# Patient Record
Sex: MALE | URBAN - METROPOLITAN AREA
[De-identification: names, ages, dates, MRNs, and addresses within clinical notes are randomized per-mention and may not be internally consistent; named-entity substitution may affect disease eponyms.]

---

## 2020-02-04 ENCOUNTER — APPOINTMENT (RX ONLY)
Dept: URBAN - METROPOLITAN AREA CLINIC 45 | Facility: CLINIC | Age: 50
Setting detail: DERMATOLOGY
End: 2020-02-04

## 2020-02-04 DIAGNOSIS — L57.8 OTHER SKIN CHANGES DUE TO CHRONIC EXPOSURE TO NONIONIZING RADIATION: ICD-10-CM

## 2020-02-04 DIAGNOSIS — D485 NEOPLASM OF UNCERTAIN BEHAVIOR OF SKIN: ICD-10-CM

## 2020-02-04 DIAGNOSIS — L82.1 OTHER SEBORRHEIC KERATOSIS: ICD-10-CM

## 2020-02-04 DIAGNOSIS — L21.8 OTHER SEBORRHEIC DERMATITIS: ICD-10-CM

## 2020-02-04 DIAGNOSIS — L57.0 ACTINIC KERATOSIS: ICD-10-CM

## 2020-02-04 PROBLEM — D48.5 NEOPLASM OF UNCERTAIN BEHAVIOR OF SKIN: Status: ACTIVE | Noted: 2020-02-04

## 2020-02-04 PROBLEM — I48.91 UNSPECIFIED ATRIAL FIBRILLATION: Status: ACTIVE | Noted: 2020-02-04

## 2020-02-04 PROCEDURE — ? COUNSELING

## 2020-02-04 PROCEDURE — ? BIOPSY BY SHAVE METHOD

## 2020-02-04 PROCEDURE — ? PRESCRIPTION

## 2020-02-04 PROCEDURE — 11102 TANGNTL BX SKIN SINGLE LES: CPT

## 2020-02-04 PROCEDURE — 99203 OFFICE O/P NEW LOW 30 MIN: CPT | Mod: 25

## 2020-02-04 PROCEDURE — ? TREATMENT REGIMEN

## 2020-02-04 RX ORDER — KETOCONAZOLE 20 MG/ML
SHAMPOO TOPICAL TIW
Qty: 1 | Refills: 1 | Status: ERX | COMMUNITY
Start: 2020-02-04

## 2020-02-04 RX ADMIN — KETOCONAZOLE: 20 SHAMPOO TOPICAL at 00:00

## 2020-02-04 ASSESSMENT — LOCATION DETAILED DESCRIPTION DERM
LOCATION DETAILED: LEFT SUPERIOR LATERAL BUCCAL CHEEK
LOCATION DETAILED: LEFT CENTRAL MALAR CHEEK
LOCATION DETAILED: RIGHT SUPERIOR MEDIAL UPPER BACK
LOCATION DETAILED: RIGHT SUPERIOR LATERAL BUCCAL CHEEK
LOCATION DETAILED: LEFT SUPERIOR MUCOSAL LIP
LOCATION DETAILED: RIGHT ANTERIOR SHOULDER
LOCATION DETAILED: LEFT SUPERIOR LATERAL MALAR CHEEK
LOCATION DETAILED: RIGHT SUPERIOR MUCOSAL LIP
LOCATION DETAILED: LEFT DISTAL DORSAL FOREARM
LOCATION DETAILED: LEFT LATERAL KNEE
LOCATION DETAILED: LEFT DISTAL PRETIBIAL REGION
LOCATION DETAILED: RIGHT SUPERIOR LATERAL MALAR CHEEK
LOCATION DETAILED: RIGHT CENTRAL MALAR CHEEK
LOCATION DETAILED: RIGHT CENTRAL EYEBROW
LOCATION DETAILED: LEFT CENTRAL EYEBROW

## 2020-02-04 ASSESSMENT — LOCATION SIMPLE DESCRIPTION DERM
LOCATION SIMPLE: RIGHT CHEEK
LOCATION SIMPLE: LEFT CHEEK
LOCATION SIMPLE: LEFT FOREARM
LOCATION SIMPLE: RIGHT SHOULDER
LOCATION SIMPLE: RIGHT UPPER BACK
LOCATION SIMPLE: LEFT PRETIBIAL REGION
LOCATION SIMPLE: RIGHT EYEBROW
LOCATION SIMPLE: LEFT KNEE
LOCATION SIMPLE: LEFT EYEBROW
LOCATION SIMPLE: LEFT SUPERIOR MUCOSAL LIP
LOCATION SIMPLE: RIGHT SUPERIOR MUCOSAL LIP

## 2020-02-04 ASSESSMENT — LOCATION ZONE DERM
LOCATION ZONE: TRUNK
LOCATION ZONE: LIP
LOCATION ZONE: FACE
LOCATION ZONE: ARM
LOCATION ZONE: LEG

## 2020-02-04 NOTE — PROCEDURE: BIOPSY BY SHAVE METHOD
Detail Level: Detailed
Depth Of Biopsy: dermis
Was A Bandage Applied: Yes
Size Of Lesion In Cm: 3.5
Biopsy Type: H and E
Biopsy Method: Personna blade
Anesthesia Type: 1% lidocaine with epinephrine
Anesthesia Volume In Cc (Will Not Render If 0): 0.5
Additional Anesthesia Volume In Cc (Will Not Render If 0): 0
Hemostasis: Electrocautery
Wound Care: Aquaphor
Dressing: bandage
Destruction After The Procedure: No
Type Of Destruction Used: Curettage
Curettage Text: The wound bed was treated with curettage after the biopsy was performed.
Cryotherapy Text: The wound bed was treated with cryotherapy after the biopsy was performed.
Electrodesiccation Text: The wound bed was treated with electrodesiccation after the biopsy was performed.
Electrodesiccation And Curettage Text: The wound bed was treated with electrodesiccation and curettage after the biopsy was performed.
Silver Nitrate Text: The wound bed was treated with silver nitrate after the biopsy was performed.
Lab: [id number]
Lab Facility: [id number]
Consent: Written consent was obtained and risks were reviewed including but not limited to scarring, infection, bleeding, scabbing, incomplete removal, nerve damage and allergy to anesthesia.
Post-Care Instructions: I reviewed with the patient in detail post-care instructions. Patient is to keep the biopsy site dry overnight, and then apply bacitracin twice daily until healed. Patient may apply hydrogen peroxide soaks to remove any crusting.
Notification Instructions: Patient will be notified of biopsy results. However, patient instructed to call the office if not contacted within 2 weeks.
Billing Type: Third-Party Bill

## 2020-02-04 NOTE — PROCEDURE: TREATMENT REGIMEN
Plan: Will recommend treatment based on biopsy results of lesion on R shoulder.
Detail Level: Detailed
Plan: Consult dentist for recommendation of oral surgeon to perform biopsy.
Initiate Treatment: ketoconazole 2 % shampoo TIW\\nDays Supply: 30\\nSig: Wash face daily then rinse

## 2020-02-25 ENCOUNTER — APPOINTMENT (RX ONLY)
Dept: URBAN - METROPOLITAN AREA CLINIC 45 | Facility: CLINIC | Age: 50
Setting detail: DERMATOLOGY
End: 2020-02-25

## 2020-02-25 DIAGNOSIS — L57.0 ACTINIC KERATOSIS: ICD-10-CM | Status: INADEQUATELY CONTROLLED

## 2020-02-25 PROCEDURE — 17000 DESTRUCT PREMALG LESION: CPT

## 2020-02-25 PROCEDURE — 17003 DESTRUCT PREMALG LES 2-14: CPT

## 2020-02-25 PROCEDURE — ? COUNSELING

## 2020-02-25 PROCEDURE — ? DEFER

## 2020-02-25 PROCEDURE — ? LIQUID NITROGEN

## 2020-02-25 PROCEDURE — ? PHOTODYNAMIC THERAPY COUNSELING

## 2020-02-25 ASSESSMENT — LOCATION DETAILED DESCRIPTION DERM
LOCATION DETAILED: LEFT CENTRAL MALAR CHEEK
LOCATION DETAILED: LEFT KNEE
LOCATION DETAILED: LEFT DISTAL PRETIBIAL REGION
LOCATION DETAILED: LEFT VENTRAL DISTAL FOREARM
LOCATION DETAILED: STERNUM

## 2020-02-25 ASSESSMENT — LOCATION SIMPLE DESCRIPTION DERM
LOCATION SIMPLE: CHEST
LOCATION SIMPLE: LEFT FOREARM
LOCATION SIMPLE: LEFT KNEE
LOCATION SIMPLE: LEFT PRETIBIAL REGION
LOCATION SIMPLE: LEFT CHEEK

## 2020-02-25 ASSESSMENT — LOCATION ZONE DERM
LOCATION ZONE: LEG
LOCATION ZONE: TRUNK
LOCATION ZONE: ARM
LOCATION ZONE: FACE

## 2020-02-25 ASSESSMENT — PAIN INTENSITY VAS: HOW INTENSE IS YOUR PAIN 0 BEING NO PAIN, 10 BEING THE MOST SEVERE PAIN POSSIBLE?: NO PAIN

## 2020-02-25 NOTE — PROCEDURE: DEFER
Scheduling Instructions (Optional): Schedule PDT for face
Procedure To Be Performed At Next Visit: PDT Blue Light
Introduction Text (Please End With A Colon): The following procedure was deferred:
Detail Level: Zone

## 2020-02-27 ENCOUNTER — APPOINTMENT (RX ONLY)
Dept: URBAN - METROPOLITAN AREA CLINIC 45 | Facility: CLINIC | Age: 50
Setting detail: DERMATOLOGY
End: 2020-02-27

## 2020-02-27 DIAGNOSIS — L57.0 ACTINIC KERATOSIS: ICD-10-CM

## 2020-02-27 PROBLEM — C44.612 BASAL CELL CARCINOMA OF SKIN OF RIGHT UPPER LIMB, INCLUDING SHOULDER: Status: ACTIVE | Noted: 2020-02-27

## 2020-02-27 PROCEDURE — 99213 OFFICE O/P EST LOW 20 MIN: CPT | Mod: 24

## 2020-02-27 PROCEDURE — ? PRESCRIPTION

## 2020-02-27 PROCEDURE — ? OTHER

## 2020-02-27 PROCEDURE — ? COUNSELING

## 2020-02-27 PROCEDURE — ? TREATMENT REGIMEN

## 2020-02-27 ASSESSMENT — LOCATION ZONE DERM: LOCATION ZONE: FACE

## 2020-02-27 ASSESSMENT — LOCATION SIMPLE DESCRIPTION DERM: LOCATION SIMPLE: LEFT CHEEK

## 2020-02-27 ASSESSMENT — LOCATION DETAILED DESCRIPTION DERM: LOCATION DETAILED: LEFT CENTRAL MALAR CHEEK

## 2020-02-27 NOTE — PROCEDURE: TREATMENT REGIMEN
Initiate Treatment: Efudex compound on face BID x5 days
Plan: Discussed blue light vs Efudex compound for treatment. We decided to use Efudex as treatment as patient with already have the prescription for treating BCC on right shoulder.
Detail Level: Zone

## 2020-02-27 NOTE — PROCEDURE: OTHER
Other (Free Text): Lesion has grown considerably since biopsy, especially likely superficial component. Discussed with patient that this would require a much larger surgery to ensure clear margins. Discussed that he would need to put his arm in a sling and minimize movement for at least two weeks to prevent wound dehiscence. Patient became upset and refused to do surgery at this visit stating “ I am a . I cannot be still with my arm for this amount of time. We are not doing the surgery today.” At this point I suggested that we treat with efudex compound prior to surgical excision to decrease the size of the cancer, especially what clinically looked like superficial component. He agreed to this plan. I then explained to him that for the nodular component surgery would still be needed and that he would have to be careful with his arm during recovery from this period as well. He also asked why he had to pay his copay every time he came to the office. I explained to him that it was legally required for him to pay his copay. I also explained that we did provide a service at this visit including a prescription and counseling and discussion of this problem and another which is his actinic damage and actinic keratoses. He was confused as to why I would tell him to do another treatment than Ms. Cadet(she had ordered cassie light therapy for actinic keratoses and actinic damage) and I explained to him that I was trying to minimize the amount he would have to pay and the number of services rendered by using the same cream on his face than his shoulder. He stated that he understood during the visit. He again became upset at checkout about paying his copay.
Note Text (......Xxx Chief Complaint.): This diagnosis correlates with the
Detail Level: Simple

## 2020-04-06 ENCOUNTER — APPOINTMENT (RX ONLY)
Dept: URBAN - METROPOLITAN AREA CLINIC 45 | Facility: CLINIC | Age: 50
Setting detail: DERMATOLOGY
End: 2020-04-06

## 2020-04-06 DIAGNOSIS — L57.0 ACTINIC KERATOSIS: ICD-10-CM | Status: UNCHANGED

## 2020-04-06 PROBLEM — C44.612 BASAL CELL CARCINOMA OF SKIN OF RIGHT UPPER LIMB, INCLUDING SHOULDER: Status: ACTIVE | Noted: 2020-04-06

## 2020-04-06 PROCEDURE — ? COUNSELING

## 2020-04-06 PROCEDURE — ? TREATMENT REGIMEN

## 2020-04-06 PROCEDURE — 99214 OFFICE O/P EST MOD 30 MIN: CPT

## 2020-04-06 PROCEDURE — ? DEFER

## 2020-04-06 ASSESSMENT — LOCATION ZONE DERM: LOCATION ZONE: FACE

## 2020-04-06 ASSESSMENT — LOCATION SIMPLE DESCRIPTION DERM: LOCATION SIMPLE: LEFT CHEEK

## 2020-04-06 ASSESSMENT — LOCATION DETAILED DESCRIPTION DERM: LOCATION DETAILED: LEFT CENTRAL MALAR CHEEK

## 2020-04-06 NOTE — PROCEDURE: DEFER
Instructions (Optional): Refer to Dr. Dykes for Mohs. Patient has treated with a very strong response the entire area. He will still need surgery to clear the nodular portion of the lesion.
Procedure To Be Performed At Next Visit: Mohs surgery
Introduction Text (Please End With A Colon): The following procedure was deferred:
Detail Level: Simple
